# Patient Record
Sex: MALE | Race: OTHER | HISPANIC OR LATINO | Employment: FULL TIME | URBAN - METROPOLITAN AREA
[De-identification: names, ages, dates, MRNs, and addresses within clinical notes are randomized per-mention and may not be internally consistent; named-entity substitution may affect disease eponyms.]

---

## 2022-08-09 ENCOUNTER — OCCUPATIONAL HEALTH (OUTPATIENT)
Dept: URGENT CARE | Facility: CLINIC | Age: 62
End: 2022-08-09

## 2022-08-09 DIAGNOSIS — Z02.1 PRE-EMPLOYMENT EXAMINATION: Primary | ICD-10-CM

## 2022-08-09 LAB
CTP QC/QA: YES
POC 10 PANEL DRUG SCREEN: NEGATIVE

## 2022-08-09 PROCEDURE — 80305 DRUG TEST PRSMV DIR OPT OBS: CPT | Mod: S$GLB,,, | Performed by: EMERGENCY MEDICINE

## 2022-08-09 PROCEDURE — 80305 POCT RAPID DRUG SCREEN 10 PANEL: ICD-10-PCS | Mod: S$GLB,,, | Performed by: EMERGENCY MEDICINE

## 2022-08-09 RX ORDER — LOSARTAN POTASSIUM 50 MG/1
50 TABLET ORAL DAILY
COMMUNITY

## 2022-08-09 NOTE — PROGRESS NOTES
Due to blood pressure being eliu patient decided to not finish his physical until he gets his blood pressure under control. The company has been informed.     EC

## 2022-08-11 ENCOUNTER — OCCUPATIONAL HEALTH (OUTPATIENT)
Dept: URGENT CARE | Facility: CLINIC | Age: 62
End: 2022-08-11

## 2022-08-11 DIAGNOSIS — Z02.1 PRE-EMPLOYMENT EXAMINATION: Primary | ICD-10-CM

## 2022-08-11 PROCEDURE — 99002 MASK FIT-QUANTITATIVE: ICD-10-PCS | Mod: S$GLB,,, | Performed by: NURSE PRACTITIONER

## 2022-08-11 PROCEDURE — 99499 UNLISTED E&M SERVICE: CPT | Mod: S$GLB,,, | Performed by: NURSE PRACTITIONER

## 2022-08-11 PROCEDURE — 94010 BREATHING CAPACITY TEST: CPT | Mod: S$GLB,,, | Performed by: NURSE PRACTITIONER

## 2022-08-11 PROCEDURE — 99002 DEVICE HANDLING PHYS/QHP: CPT | Mod: S$GLB,,, | Performed by: NURSE PRACTITIONER

## 2022-08-11 PROCEDURE — 99080 SPECIAL REPORTS OR FORMS: CPT | Mod: S$GLB,,, | Performed by: NURSE PRACTITIONER

## 2022-08-11 PROCEDURE — 94010 PULMONARY FUNCTION SCREENING (OCC MED PHYSICALS): ICD-10-PCS | Mod: S$GLB,,, | Performed by: NURSE PRACTITIONER

## 2022-08-11 PROCEDURE — 92552 PURE TONE AUDIOMETRY AIR: CPT | Mod: S$GLB,,, | Performed by: NURSE PRACTITIONER

## 2022-08-11 PROCEDURE — 92552 AUDIOGRAM OCC MED: ICD-10-PCS | Mod: S$GLB,,, | Performed by: NURSE PRACTITIONER

## 2022-08-11 PROCEDURE — 99499 PHYSICAL, BASIC COMPLEXITY: ICD-10-PCS | Mod: S$GLB,,, | Performed by: NURSE PRACTITIONER

## 2022-08-11 PROCEDURE — 99080 OSHA QUESTIONNAIRE: ICD-10-PCS | Mod: S$GLB,,, | Performed by: NURSE PRACTITIONER

## 2023-05-02 ENCOUNTER — HOSPITAL ENCOUNTER (EMERGENCY)
Facility: HOSPITAL | Age: 63
Discharge: HOME | End: 2023-05-02
Attending: EMERGENCY MEDICINE

## 2023-05-02 VITALS
HEIGHT: 65 IN | OXYGEN SATURATION: 99 % | HEART RATE: 76 BPM | WEIGHT: 180 LBS | DIASTOLIC BLOOD PRESSURE: 91 MMHG | SYSTOLIC BLOOD PRESSURE: 150 MMHG | RESPIRATION RATE: 18 BRPM | TEMPERATURE: 98 F | BODY MASS INDEX: 29.99 KG/M2

## 2023-05-02 DIAGNOSIS — S62.112A CLOSED DISPLACED FRACTURE OF TRIQUETRUM OF LEFT WRIST, INITIAL ENCOUNTER: ICD-10-CM

## 2023-05-02 DIAGNOSIS — I10 UNCONTROLLED HYPERTENSION: ICD-10-CM

## 2023-05-02 DIAGNOSIS — L50.9 URTICARIA: Primary | ICD-10-CM

## 2023-05-02 DIAGNOSIS — M25.532 LEFT WRIST PAIN: ICD-10-CM

## 2023-05-02 PROCEDURE — 99284 EMERGENCY DEPT VISIT MOD MDM: CPT

## 2023-05-02 PROCEDURE — 25000003 PHARM REV CODE 250: Performed by: PHYSICIAN ASSISTANT

## 2023-05-02 PROCEDURE — 29125 APPL SHORT ARM SPLINT STATIC: CPT | Mod: LT

## 2023-05-02 RX ORDER — FAMOTIDINE 20 MG/1
20 TABLET, FILM COATED ORAL 2 TIMES DAILY
Qty: 20 TABLET | Refills: 0 | Status: SHIPPED | OUTPATIENT
Start: 2023-05-02 | End: 2023-05-12

## 2023-05-02 RX ORDER — CLONIDINE HYDROCHLORIDE 0.1 MG/1
0.1 TABLET ORAL
Status: COMPLETED | OUTPATIENT
Start: 2023-05-02 | End: 2023-05-02

## 2023-05-02 RX ORDER — DIPHENHYDRAMINE HCL 25 MG
25 CAPSULE ORAL EVERY 6 HOURS PRN
Qty: 20 CAPSULE | Refills: 0 | Status: SHIPPED | OUTPATIENT
Start: 2023-05-02

## 2023-05-02 RX ADMIN — CLONIDINE HYDROCHLORIDE 0.1 MG: 0.1 TABLET ORAL at 03:05

## 2023-05-02 NOTE — DISCHARGE INSTRUCTIONS
Dieta baja en sal. Continúe con Losartan diariamente. Pepcid dos veces al día. Benadryl según sea necesario para el sarpullido continuo con picazón. Recuerde, benadryl es un medicamento sedante; no lo tome con alcohol, si está conduciendo, o con cualquier otro medicamento sedante. La loción de calamina de venta monique también puede ayudar con la picazón. Sarah un seguimiento y establezca atención con un proveedor de atención primaria local para la reevaluación de weber sarpullido con picazón, para la reevaluación de la presión arterial elevada continua. Sarah un seguimiento y establezca atención con un médico ortopédico local o un cirujano de la mano para evaluar la fractura de weber mano. Mantenga la férula en weber lugar hasta que sarah un seguimiento con ortopedia. Regrese a destiny servicio de urgencias si empeora el dolor en la mano o la keyur, si empeora el sarpullido a pesar del tratamiento, si comienza con dificultad para respirar o dolor en el pecho, si comienza con dolor de rickey intenso, si ocurre cualquier otro problema.    Low salt diet. Continue Losartan daily.  Pepcid twice daily.  Benadryl as needed for continued itchy rash. Remember, benadryl is a sedating medication; do not take with alcohol, if you are driving, or with any other sedating medications. Over-the-counter calamine lotion may help with the itch as well.  Please follow-up and establish care with a local primary care provider for re-evaluation of your itchy rash, for re-evaluation of continued elevated blood pressure.  Follow-up and establish care with a local orthopedic physician or hand surgeon for evaluation of fracture of your hand.  Keep the splint in place until you follow-up with orthopedics.  Return to this ED if worsening hand or wrist pain, if worsening rash despite treatment, if you begin with shortness of breath or chest pain, if you begin with severe headache, if any other problems occur.

## 2023-05-02 NOTE — ED TRIAGE NOTES
Interpeter # [id number]  Pt presents to ER with c/o Allergy on his chest and his arms and back. Pt denies any other s/s at this time. Pt AAOx4     Pt just informed of left hand pain from when he fell 15 days ago.

## 2023-05-02 NOTE — ED PROVIDER NOTES
Encounter Date: 5/2/2023       History     Chief Complaint   Patient presents with    Rash     Pt presents to ED c/o rash to bilateral arms, back and chest x 2-3 days.  Associated symptoms itching.    Denies fever, chills, cp, sob, ha, dizziness,  n/v or any other symptoms.    Pt BP in triage 221/115, hx of HTN, pt reports taking medication on yesterday.  Pain 8/10- from rash-itching     63yo M presents to ED with chief complaint L wrist pain after fall, pruritic rash, elevated BP.    Pt states began on Sunday with pruritic rash to his chest, arms, R leg, back. Lesions come and go. Denies facial or neck swelling, lip or tongue swelling. Denies SOB, CP, chest tightness, cough. No fever. No open wounds. Denies hx similar rash. Denies any new exposures. No meds taken pta. Denies hx severe allergies.     Pt states he also slipped and fell last Wednesday; admits to pain, swelling, tenderness to L dorsal wrist. No open wound. Describes FOOSH mechanism. R hand dominant. Painful ROM of L wrist since that time.     Pt admits to hx HTN, compliant with daily rx. He states that each time he is checked his BP is still elevated. He does admit to white coat syndrome.    He does not have a local PCP.     PMH:  HTN    Daily rx: Losartan 50mg qd    Review of patient's allergies indicates:  No Known Allergies  History reviewed. No pertinent past medical history.  History reviewed. No pertinent surgical history.  History reviewed. No pertinent family history.  Social History     Tobacco Use    Smoking status: Never    Smokeless tobacco: Never   Substance Use Topics    Alcohol use: Yes     Comment: pt drinks beers occasionally     Review of Systems   Constitutional:  Negative for fever.   HENT:  Negative for facial swelling, sore throat and trouble swallowing.    Respiratory:  Negative for cough and shortness of breath.    Cardiovascular:  Negative for chest pain.   Musculoskeletal:  Positive for arthralgias and joint swelling.   Skin:   Positive for rash.   Neurological:  Negative for syncope.     Physical Exam     Initial Vitals [05/02/23 0212]   BP Pulse Resp Temp SpO2   (!) 211/115 90 20 98.4 °F (36.9 °C) 97 %      MAP       --         Physical Exam    Nursing note and vitals reviewed.  Constitutional: He appears well-developed and well-nourished. He is not diaphoretic. No distress.   HENT:   Head: Normocephalic and atraumatic.   Neck: Neck supple.   Normal range of motion.  Cardiovascular:            No significant pretibial edema.  No unilateral leg swelling or calf tenderness.   Pulmonary/Chest: Breath sounds normal. No respiratory distress.   Musculoskeletal:      Cervical back: Normal range of motion and neck supple.      Comments: L wrist: swelling, tenderness noted to dorsal L wrist between distal radius and ulna. No snuffbox ttp. No open wound. Pain to this region with pronation/supination of forearm, with passive wrist flexion/extension. Limited active ROM 2/2 pain. 2+radial bilaterally.      Neurological: He is alert and oriented to person, place, and time. GCS score is 15. GCS eye subscore is 4. GCS verbal subscore is 5. GCS motor subscore is 6.   Skin: Skin is warm.   Scattered maculopapular, raised, skin-colored lesions to upper sternum region, to R antecubital fossa, to upper thoracic midback   Psychiatric: He has a normal mood and affect. Thought content normal.       ED Course   Splint Application    Date/Time: 5/2/2023 7:25 PM  Performed by: Sage Lewis PA-C  Authorized by: Federica Gregory MD   Location details: left wrist  Splint type: dorsal short arm.  Supplies used: Ortho-Glass  Post-procedure: The splinted body part was neurovascularly unchanged following the procedure.  Patient tolerance: Patient tolerated the procedure well with no immediate complications      Labs Reviewed - No data to display       Imaging Results               X-Ray Wrist Complete Left (Final result)  Result time 05/02/23 04:44:21      Final  result by Mikey Mcdaniel MD (05/02/23 04:44:21)                   Impression:      As above.    This report was flagged in Epic as abnormal.      Electronically signed by: Mikey Mcdaniel MD  Date:    05/02/2023  Time:    04:44               Narrative:    EXAMINATION:  XR WRIST COMPLETE 3 VIEWS LEFT    CLINICAL HISTORY:  Pain in left wrist    TECHNIQUE:  PA, lateral, and oblique views of the left wrist were performed.    COMPARISON:  None    FINDINGS:  There are multiple small irregular ossific fragments along the dorsal aspect of the proximal carpal row.  These are most likely relate to a comminuted triquetral fracture, although a lunate or scaphoid donor fracture site is not excluded.  There is significant abnormal widening of the scapholunate interval measuring up to 0.7 cm concerning for scapholunate ligament tear.  Additionally, there is suspected corresponding DISI deformity.  The distal radius and ulna appear intact.  Orthopedic consultation/close follow-up is advised.  Further evaluation could be performed with dedicated cross-sectional imaging when clinically appropriate.                                       Medications   cloNIDine tablet 0.1 mg (0.1 mg Oral Given 5/2/23 0355)     Medical Decision Making:   Differential Diagnosis:   Anaphylaxis, cellulitis, folliculitis, urticaria, wrist fx, contusion, sprain/strain  Clinical Tests:   Radiological Study: Ordered and Reviewed  ED Management:  He denies lightheadedness/dizziness, headache, n/v, CP, SOB. Did have similarly elevated BP on previous visit (chart needs to be merged). Compliant with Losartan. Given dose of Clonidine in ED. Low suspicion for end organ damage at this time. Pruritus 2/2 urticarial lesions, unsure culprit. No generalized pruritis. No evidence of volume overload. Denies hx hepatic or renal issues.     Xray wrist pending, mechanical fall, FOOSH injury.     Suspect urticaria, unsure culprit, discharged with antihistamines.  Given  instructions to follow-up with a local PCP for evaluation of continued elevated blood pressure.  Referral to Orthopedics and hand surgery placed as well.           ED Course as of 05/02/23 1926   Tue May 02, 2023   0443 X-Ray Wrist Complete Left  Carpal fracture [SM]      ED Course User Index  [SM] Sage Lewis PA-C                 Clinical Impression:   Final diagnoses:  [M25.532] Left wrist pain  [I10] Uncontrolled hypertension  [L50.9] Urticaria (Primary)  [S62.112A] Closed displaced fracture of triquetrum of left wrist, initial encounter        ED Disposition Condition    Discharge Stable          ED Prescriptions       Medication Sig Dispense Start Date End Date Auth. Provider    famotidine (PEPCID) 20 MG tablet Take 1 tablet (20 mg total) by mouth 2 (two) times daily. for 10 days 20 tablet 5/2/2023 5/12/2023 Sage Lewis PA-C    diphenhydrAMINE (BENADRYL) 25 mg capsule Take 1 capsule (25 mg total) by mouth every 6 (six) hours as needed for Itching or Allergies. 20 capsule 5/2/2023 -- Sage Lewis PA-C          Follow-up Information       Follow up With Specialties Details Why Contact Info    [facility]  Schedule an appointment as soon as possible for a visit  To establish primary care physician, for reevaluation [address] [place] [place] [address]  [phone]      [facility]  Schedule an appointment as soon as possible for a visit   [address] [place] [id number] [phone]    Ellis Manzanares MD Orthopedic Surgery Schedule an appointment as soon as possible for a visit  to schedule an appointment for followup with Orthopedics, For reevaluation [address]  [address] [place] [address]  [phone]               Sage Lewis PA-C  05/02/23 1926

## 2023-05-03 ENCOUNTER — TELEPHONE (OUTPATIENT)
Dept: ORTHOPEDICS | Facility: CLINIC | Age: 63
End: 2023-05-03

## 2023-05-04 ENCOUNTER — TELEPHONE (OUTPATIENT)
Dept: ORTHOPEDICS | Facility: CLINIC | Age: 63
End: 2023-05-04